# Patient Record
(demographics unavailable — no encounter records)

---
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
## 2022-05-01 NOTE — ED
General Adult HPI





- General


Chief complaint: Upper Respiratory Infection


Stated complaint: congestion


Time Seen by Provider: 05/01/22 07:48


Source: patient, family, RN notes reviewed


Mode of arrival: wheelchair


Limitations: no limitations





- History of Present Illness


Initial comments: 


31-year-old female presents emergency from chief complaint of severe sinus 

pressure, congestion.  Patient states she's been sick for last 1 week.  Patient 

states that she has increased amounts of nasal drainage, drainage from her eyes.

 Patient states she is coughing but most which is from postnasal drainage.  No 

shortness breath no chest pain no GI symptoms.  Patient states she works at a 

[employer] also multiple sick people.  Patient denies any other associated com

plaints.








- Related Data


                                  Previous Rx's











 Medication  Instructions  Recorded


 


Amoxicillin/Potassium Clav 1 tab PO Q12HR #20 tab 05/01/22





[Augmentin 875-125 Tablet]  











                                    Allergies











Allergy/AdvReac Type Severity Reaction Status Date / Time


 


No Known Allergies Allergy   Verified 05/01/22 07:48














Review of Systems


ROS Statement: 


Those systems with pertinent positive or pertinent negative responses have been 

documented in the HPI.





ROS Other: All systems not noted in ROS Statement are negative.





Past Medical History


Past Medical History: No Reported History


History of Any Multi-Drug Resistant Organisms: None Reported


Past Surgical History: Hernia Repair


Past Psychological History: Anxiety


Smoking Status: Never smoker


Past Alcohol Use History: None Reported


Past Drug Use History: None Reported





General Exam


Limitations: no limitations


General appearance: alert, in no apparent distress


Head exam: Present: atraumatic, normocephalic, normal inspection


Eye exam: Present: normal appearance, PERRL, EOMI.  Absent: scleral icterus, 

conjunctival injection, periorbital swelling


ENT exam: Present: mucous membranes moist, TM's normal bilaterally, normal 

external ear exam.  Absent: normal exam, normal oropharynx (Postnasal drainage)


Neck exam: Present: normal inspection, full ROM.  Absent: tenderness, meni

ngismus, lymphadenopathy


Respiratory exam: Present: normal lung sounds bilaterally.  Absent: respiratory 

distress, wheezes, rales, rhonchi, stridor


Cardiovascular Exam: Present: regular rate, normal rhythm, normal heart sounds. 

Absent: systolic murmur, diastolic murmur, rubs, gallop, clicks


Neurological exam: Present: alert, oriented X3


Skin exam: Present: warm, dry, intact, normal color.  Absent: rash





Course





                                   Vital Signs











  05/01/22





  07:43


 


Temperature 97.7 F


 


Pulse Rate 71


 


Respiratory 18





Rate 


 


Blood Pressure 119/81


 


O2 Sat by Pulse 97





Oximetry 














Medical Decision Making





- Medical Decision Making





Negative influenza, negative: 19.  Patient has acute sinusitis.  Patient 

continue decongestants, placed on antibiotics return parameters were discussed.





- Lab Data





                                   Lab Results











  05/01/22 05/01/22 Range/Units





  08:02 08:02 


 


Coronavirus (PCR)   Not Detected  (Not Detectd)  


 


Influenza Type A RNA  Not Detected   (Not Detectd)  


 


Influenza Type B (PCR)  Not Detected   (Not Detectd)  














Disposition


Clinical Impression: 


 Sinusitis





Disposition: HOME SELF-CARE


Condition: Stable


Instructions (If sedation given, give patient instructions):  Sinusitis (ED)


Additional Instructions: 


Please return to the Emergency Department if symptoms worsen or any other 

concerns.


Prescriptions: 


Amoxicillin/Potassium Clav [Augmentin 875-125 Tablet] 1 tab PO Q12HR #20 tab


Is patient prescribed a controlled substance at d/c from ED?: No


Referrals: 


None,Stated [Primary Care Provider] - 1-2 days


Time of Disposition: 08:50